# Patient Record
Sex: MALE | Race: WHITE | NOT HISPANIC OR LATINO | URBAN - METROPOLITAN AREA
[De-identification: names, ages, dates, MRNs, and addresses within clinical notes are randomized per-mention and may not be internally consistent; named-entity substitution may affect disease eponyms.]

---

## 2020-07-15 ENCOUNTER — APPOINTMENT (RX ONLY)
Dept: URBAN - METROPOLITAN AREA CLINIC 347 | Facility: CLINIC | Age: 20
Setting detail: DERMATOLOGY
End: 2020-07-15

## 2020-07-15 DIAGNOSIS — B86 SCABIES: ICD-10-CM

## 2020-07-15 PROCEDURE — 99202 OFFICE O/P NEW SF 15 MIN: CPT | Mod: 95

## 2020-07-15 PROCEDURE — ? COUNSELING

## 2020-07-15 PROCEDURE — ? PRESCRIPTION

## 2020-07-15 RX ORDER — TRIAMCINOLONE ACETONIDE 1 MG/G
CREAM TOPICAL
Qty: 1 | Refills: 1 | Status: ERX | COMMUNITY
Start: 2020-07-15

## 2020-07-15 RX ORDER — IVERMECTIN 3 MG/1
TABLET ORAL
Qty: 10 | Refills: 0 | Status: ERX | COMMUNITY
Start: 2020-07-15

## 2020-07-15 RX ADMIN — TRIAMCINOLONE ACETONIDE: 1 CREAM TOPICAL at 00:00

## 2020-07-15 RX ADMIN — IVERMECTIN: 3 TABLET ORAL at 00:00

## 2020-07-15 ASSESSMENT — LOCATION DETAILED DESCRIPTION DERM: LOCATION DETAILED: PERIUMBILICAL SKIN

## 2020-07-15 ASSESSMENT — LOCATION SIMPLE DESCRIPTION DERM: LOCATION SIMPLE: ABDOMEN

## 2020-07-15 ASSESSMENT — LOCATION ZONE DERM: LOCATION ZONE: TRUNK

## 2020-08-05 ENCOUNTER — APPOINTMENT (RX ONLY)
Dept: URBAN - METROPOLITAN AREA CLINIC 344 | Facility: CLINIC | Age: 20
Setting detail: DERMATOLOGY
End: 2020-08-05

## 2020-08-05 DIAGNOSIS — B86 SCABIES: ICD-10-CM

## 2020-08-05 PROCEDURE — ? MEDICATION COUNSELING

## 2020-08-05 PROCEDURE — ? TREATMENT REGIMEN

## 2020-08-05 PROCEDURE — ? COUNSELING

## 2020-08-05 PROCEDURE — 99213 OFFICE O/P EST LOW 20 MIN: CPT

## 2020-08-05 PROCEDURE — ? PRESCRIPTION

## 2020-08-05 RX ORDER — METHYLPREDNISOLONE 4 MG/1
TABLET ORAL
Qty: 1 | Refills: 0 | Status: ERX | COMMUNITY
Start: 2020-08-05

## 2020-08-05 RX ORDER — TRIAMCINOLONE ACETONIDE 1 MG/G
CREAM TOPICAL
Qty: 1 | Refills: 1 | Status: ERX

## 2020-08-05 RX ADMIN — METHYLPREDNISOLONE: 4 TABLET ORAL at 00:00

## 2020-08-05 ASSESSMENT — LOCATION DETAILED DESCRIPTION DERM: LOCATION DETAILED: PERIUMBILICAL SKIN

## 2020-08-05 ASSESSMENT — LOCATION SIMPLE DESCRIPTION DERM: LOCATION SIMPLE: ABDOMEN

## 2020-08-05 ASSESSMENT — LOCATION ZONE DERM: LOCATION ZONE: TRUNK

## 2020-08-05 NOTE — PROCEDURE: MEDICATION COUNSELING
Xellucilaz Pregnancy And Lactation Text: This medication is Pregnancy Category D and is not considered safe during pregnancy.  The risk during breast feeding is also uncertain.

## 2020-08-05 NOTE — PROCEDURE: TREATMENT REGIMEN
Detail Level: Zone
Initiate Treatment: Medrol (Serafin) 4 mg tablets in a dose pack \\nSig: Take as directed\\n\\ntriamcinolone acetonide 0.1 % topical cream \\nDays Supply: 30\\nSig: Apply to itchy body rash twice daily x 2 weeks then just as needed for itching. May keep refrigerated for added relief.

## 2020-09-04 ENCOUNTER — APPOINTMENT (RX ONLY)
Dept: URBAN - METROPOLITAN AREA CLINIC 347 | Facility: CLINIC | Age: 20
Setting detail: DERMATOLOGY
End: 2020-09-04

## 2020-09-04 ENCOUNTER — RX ONLY (OUTPATIENT)
Age: 20
Setting detail: RX ONLY
End: 2020-09-04

## 2020-09-04 DIAGNOSIS — B86 SCABIES: ICD-10-CM

## 2020-09-04 PROCEDURE — ? COUNSELING

## 2020-09-04 PROCEDURE — 99213 OFFICE O/P EST LOW 20 MIN: CPT

## 2020-09-04 PROCEDURE — ? PRESCRIPTION

## 2020-09-04 PROCEDURE — ? TREATMENT REGIMEN

## 2020-09-04 RX ORDER — IVERMECTIN 3 MG/1
TABLET ORAL
Qty: 10 | Refills: 0 | Status: ERX

## 2020-09-04 RX ORDER — TRIAMCINOLONE ACETONIDE 1 MG/G
CREAM TOPICAL
Qty: 1 | Refills: 0 | Status: ERX

## 2020-09-04 RX ORDER — METHYLPREDNISOLONE 4 MG/1
TABLET ORAL
Qty: 1 | Refills: 0 | Status: ERX

## 2020-09-04 ASSESSMENT — LOCATION SIMPLE DESCRIPTION DERM: LOCATION SIMPLE: ABDOMEN

## 2020-09-04 ASSESSMENT — LOCATION DETAILED DESCRIPTION DERM: LOCATION DETAILED: PERIUMBILICAL SKIN

## 2020-09-04 ASSESSMENT — LOCATION ZONE DERM: LOCATION ZONE: TRUNK

## 2020-09-04 NOTE — PROCEDURE: TREATMENT REGIMEN
Detail Level: Zone
Initiate Treatment: Medrol Dose pack as directed on packaging \\nIvermectin 3mg, five pills today and five pills one week later
Otc Regimen: Allegra 180mg one pill twice daily (am&Pm)\\n\\nLuke warm shorter showers, wash with only hands - no washcloths \\n\\nDISCONTINUE:  -products with dye and fragrance -hand sanitizers -dryer sheets, fabric softeners -avoid soaps such as: dial, ivory, coast, lever, [place] spring, antibacterial soap RECOMMENDED:   -Mild, tepid bathing; avoid washcloths -Body and facial Soaps/Cleansers/Moisturizers: Dove, Cetaphil, CeraVe, Aveeno, Loren, or  Eucerin -Hypoallergenic laundry detergents such as: Tide free and gentle, All free and clear
Continue Regimen: Triamcinolone cream twice daily to all affected areas

## 2023-05-09 NOTE — PROCEDURE: MEDICATION COUNSELING
spoon/self fed cup/self fed Xolair Pregnancy And Lactation Text: This medication is Pregnancy Category B and is considered safe during pregnancy. This medication is excreted in breast milk.

## 2024-02-07 NOTE — PROCEDURE: MEDICATION COUNSELING
Patient is not pregnant (male or female) Siliq Pregnancy And Lactation Text: The risk during pregnancy and breastfeeding is uncertain with this medication.

## 2024-02-10 NOTE — PROCEDURE: MEDICATION COUNSELING
10-Feb-2024 06:31 Taltz Counseling: I discussed with the patient the risks of ixekizumab including but not limited to immunosuppression, serious infections, worsening of inflammatory bowel disease and drug reactions.  The patient understands that monitoring is required including a PPD at baseline and must alert us or the primary physician if symptoms of infection or other concerning signs are noted.